# Patient Record
Sex: FEMALE | Employment: FULL TIME | URBAN - METROPOLITAN AREA
[De-identification: names, ages, dates, MRNs, and addresses within clinical notes are randomized per-mention and may not be internally consistent; named-entity substitution may affect disease eponyms.]

---

## 2020-07-20 ENCOUNTER — NURSE TRIAGE (OUTPATIENT)
Dept: NURSING | Facility: CLINIC | Age: 35
End: 2020-07-20

## 2020-07-20 DIAGNOSIS — Z11.59 SCREENING FOR VIRAL DISEASE: Primary | ICD-10-CM

## 2020-07-20 NOTE — TELEPHONE ENCOUNTER
"Patient is calling requesting COVID serologic antibody testing.  NOTE: Serologic testing is a blood test for 'antibodies' which are made at 10-14 days after you have had symptoms of COVID or were exposed and had an asymptomatic infection.  This does NOT test you for 'active' infection or tell you if you are contagious.    Are you a healthcare worker? no  Do you currently have a cough, fever, body aches, shortness of breath, or difficulty breathing?  No  Did you previously have cough, fever, body aches, shortness of breath, or difficulty breathing that have now resolved? Has had previous covid symptoms.   Symptoms began 150 days ago.  Symptoms started > 14 days ago. Lab order placed per SARS-CoV-2 Serology test Standing Order using indication \"Previously symptomatic >14d since onset, currently asymptomatic\" and diagnosis code \"Screening for viral disease\" (Z11.59)      The patient was informed: \"Testing is limited each day and it may take time for testing to be available to everyone who has called. You will receive a call within 48-72 hours to schedule the serology testing. Please confirm the best number to reach you is .  If you have any questions about scheduling, call [phone].\"       "

## 2020-07-31 DIAGNOSIS — Z11.59 SCREENING FOR VIRAL DISEASE: ICD-10-CM

## 2020-07-31 PROCEDURE — 36415 COLL VENOUS BLD VENIPUNCTURE: CPT | Performed by: EMERGENCY MEDICINE

## 2020-07-31 PROCEDURE — 86769 SARS-COV-2 COVID-19 ANTIBODY: CPT | Mod: 90 | Performed by: EMERGENCY MEDICINE

## 2020-07-31 PROCEDURE — 99000 SPECIMEN HANDLING OFFICE-LAB: CPT | Performed by: EMERGENCY MEDICINE

## 2020-08-02 LAB
COVID-19 SPIKE RBD ABY TITER: NORMAL
COVID-19 SPIKE RBD ABY: NEGATIVE